# Patient Record
Sex: FEMALE | Race: BLACK OR AFRICAN AMERICAN | NOT HISPANIC OR LATINO | URBAN - METROPOLITAN AREA
[De-identification: names, ages, dates, MRNs, and addresses within clinical notes are randomized per-mention and may not be internally consistent; named-entity substitution may affect disease eponyms.]

---

## 2018-10-14 ENCOUNTER — EMERGENCY (EMERGENCY)
Facility: HOSPITAL | Age: 58
LOS: 0 days | Discharge: HOME | End: 2018-10-14
Attending: EMERGENCY MEDICINE
Payer: COMMERCIAL

## 2018-10-14 VITALS
DIASTOLIC BLOOD PRESSURE: 88 MMHG | OXYGEN SATURATION: 100 % | RESPIRATION RATE: 18 BRPM | TEMPERATURE: 99 F | HEART RATE: 114 BPM | WEIGHT: 139.99 LBS | SYSTOLIC BLOOD PRESSURE: 151 MMHG | HEIGHT: 61 IN

## 2018-10-14 VITALS
SYSTOLIC BLOOD PRESSURE: 131 MMHG | RESPIRATION RATE: 16 BRPM | DIASTOLIC BLOOD PRESSURE: 82 MMHG | OXYGEN SATURATION: 98 % | HEART RATE: 103 BPM | TEMPERATURE: 99 F

## 2018-10-14 DIAGNOSIS — Z88.0 ALLERGY STATUS TO PENICILLIN: ICD-10-CM

## 2018-10-14 DIAGNOSIS — R10.9 UNSPECIFIED ABDOMINAL PAIN: ICD-10-CM

## 2018-10-14 DIAGNOSIS — R91.1 SOLITARY PULMONARY NODULE: ICD-10-CM

## 2018-10-14 DIAGNOSIS — N93.9 ABNORMAL UTERINE AND VAGINAL BLEEDING, UNSPECIFIED: ICD-10-CM

## 2018-10-14 DIAGNOSIS — N30.90 CYSTITIS, UNSPECIFIED WITHOUT HEMATURIA: ICD-10-CM

## 2018-10-14 DIAGNOSIS — R31.9 HEMATURIA, UNSPECIFIED: ICD-10-CM

## 2018-10-14 LAB
ALBUMIN SERPL ELPH-MCNC: 3.6 G/DL — SIGNIFICANT CHANGE UP (ref 3.3–5)
ALP SERPL-CCNC: 72 U/L — SIGNIFICANT CHANGE UP (ref 40–120)
ALT FLD-CCNC: 15 U/L — SIGNIFICANT CHANGE UP (ref 12–78)
ANION GAP SERPL CALC-SCNC: 9 MMOL/L — SIGNIFICANT CHANGE UP (ref 5–17)
APPEARANCE UR: ABNORMAL
APTT BLD: 25.8 SEC — LOW (ref 27.5–37.4)
AST SERPL-CCNC: 4 U/L — LOW (ref 15–37)
BACTERIA # UR AUTO: ABNORMAL
BASOPHILS # BLD AUTO: 0.02 K/UL — SIGNIFICANT CHANGE UP (ref 0–0.2)
BASOPHILS NFR BLD AUTO: 0.2 % — SIGNIFICANT CHANGE UP (ref 0–2)
BILIRUB SERPL-MCNC: 0.5 MG/DL — SIGNIFICANT CHANGE UP (ref 0.2–1.2)
BILIRUB UR-MCNC: NEGATIVE — SIGNIFICANT CHANGE UP
BUN SERPL-MCNC: 9 MG/DL — SIGNIFICANT CHANGE UP (ref 7–23)
CALCIUM SERPL-MCNC: 9.1 MG/DL — SIGNIFICANT CHANGE UP (ref 8.5–10.1)
CHLORIDE SERPL-SCNC: 103 MMOL/L — SIGNIFICANT CHANGE UP (ref 96–108)
CO2 SERPL-SCNC: 28 MMOL/L — SIGNIFICANT CHANGE UP (ref 22–31)
COLOR SPEC: ABNORMAL
CREAT SERPL-MCNC: 0.72 MG/DL — SIGNIFICANT CHANGE UP (ref 0.5–1.3)
DIFF PNL FLD: ABNORMAL
EOSINOPHIL # BLD AUTO: 0.07 K/UL — SIGNIFICANT CHANGE UP (ref 0–0.5)
EOSINOPHIL NFR BLD AUTO: 0.7 % — SIGNIFICANT CHANGE UP (ref 0–6)
GLUCOSE SERPL-MCNC: 227 MG/DL — HIGH (ref 70–99)
GLUCOSE UR QL: 250 MG/DL
HCT VFR BLD CALC: 36.4 % — SIGNIFICANT CHANGE UP (ref 34.5–45)
HGB BLD-MCNC: 11.6 G/DL — SIGNIFICANT CHANGE UP (ref 11.5–15.5)
IMM GRANULOCYTES NFR BLD AUTO: 0.3 % — SIGNIFICANT CHANGE UP (ref 0–1.5)
INR BLD: 1.14 RATIO — SIGNIFICANT CHANGE UP (ref 0.88–1.16)
KETONES UR-MCNC: NEGATIVE — SIGNIFICANT CHANGE UP
LEUKOCYTE ESTERASE UR-ACNC: ABNORMAL
LYMPHOCYTES # BLD AUTO: 1.55 K/UL — SIGNIFICANT CHANGE UP (ref 1–3.3)
LYMPHOCYTES # BLD AUTO: 16.3 % — SIGNIFICANT CHANGE UP (ref 13–44)
MCHC RBC-ENTMCNC: 26.7 PG — LOW (ref 27–34)
MCHC RBC-ENTMCNC: 31.9 GM/DL — LOW (ref 32–36)
MCV RBC AUTO: 83.9 FL — SIGNIFICANT CHANGE UP (ref 80–100)
MONOCYTES # BLD AUTO: 0.46 K/UL — SIGNIFICANT CHANGE UP (ref 0–0.9)
MONOCYTES NFR BLD AUTO: 4.8 % — SIGNIFICANT CHANGE UP (ref 2–14)
NEUTROPHILS # BLD AUTO: 7.37 K/UL — SIGNIFICANT CHANGE UP (ref 1.8–7.4)
NEUTROPHILS NFR BLD AUTO: 77.7 % — HIGH (ref 43–77)
NITRITE UR-MCNC: NEGATIVE — SIGNIFICANT CHANGE UP
NRBC # BLD: 0 /100 WBCS — SIGNIFICANT CHANGE UP (ref 0–0)
PH UR: 5 — SIGNIFICANT CHANGE UP (ref 5–8)
PLATELET # BLD AUTO: 327 K/UL — SIGNIFICANT CHANGE UP (ref 150–400)
POTASSIUM SERPL-MCNC: 4 MMOL/L — SIGNIFICANT CHANGE UP (ref 3.5–5.3)
POTASSIUM SERPL-SCNC: 4 MMOL/L — SIGNIFICANT CHANGE UP (ref 3.5–5.3)
PROT SERPL-MCNC: 7.7 GM/DL — SIGNIFICANT CHANGE UP (ref 6–8.3)
PROT UR-MCNC: 100 MG/DL
PROTHROM AB SERPL-ACNC: 12.5 SEC — SIGNIFICANT CHANGE UP (ref 9.8–12.7)
RBC # BLD: 4.34 M/UL — SIGNIFICANT CHANGE UP (ref 3.8–5.2)
RBC # FLD: 13.3 % — SIGNIFICANT CHANGE UP (ref 10.3–14.5)
RBC CASTS # UR COMP ASSIST: >50 /HPF (ref 0–4)
SODIUM SERPL-SCNC: 140 MMOL/L — SIGNIFICANT CHANGE UP (ref 135–145)
SP GR SPEC: 1.02 — SIGNIFICANT CHANGE UP (ref 1.01–1.02)
UROBILINOGEN FLD QL: NEGATIVE MG/DL — SIGNIFICANT CHANGE UP
WBC # BLD: 9.5 K/UL — SIGNIFICANT CHANGE UP (ref 3.8–10.5)
WBC # FLD AUTO: 9.5 K/UL — SIGNIFICANT CHANGE UP (ref 3.8–10.5)
WBC UR QL: >50

## 2018-10-14 PROCEDURE — 74176 CT ABD & PELVIS W/O CONTRAST: CPT | Mod: 26

## 2018-10-14 PROCEDURE — 99284 EMERGENCY DEPT VISIT MOD MDM: CPT | Mod: 25

## 2018-10-14 RX ORDER — SODIUM CHLORIDE 9 MG/ML
1500 INJECTION INTRAMUSCULAR; INTRAVENOUS; SUBCUTANEOUS ONCE
Qty: 0 | Refills: 0 | Status: COMPLETED | OUTPATIENT
Start: 2018-10-14 | End: 2018-10-14

## 2018-10-14 RX ADMIN — SODIUM CHLORIDE 1500 MILLILITER(S): 9 INJECTION INTRAMUSCULAR; INTRAVENOUS; SUBCUTANEOUS at 11:11

## 2018-10-14 RX ADMIN — SODIUM CHLORIDE 750 MILLILITER(S): 9 INJECTION INTRAMUSCULAR; INTRAVENOUS; SUBCUTANEOUS at 09:24
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
## 2018-10-14 NOTE — ED ADULT NURSE NOTE - NSIMPLEMENTINTERV_GEN_ALL_ED
Implemented All Universal Safety Interventions:  [place] to call system. Call bell, personal items and telephone within reach. Instruct patient to call for assistance. Room bathroom lighting operational. Non-slip footwear when patient is off stretcher. Physically safe environment: no spills, clutter or unnecessary equipment. Stretcher in lowest position, wheels locked, appropriate side rails in place.

## 2018-10-14 NOTE — ED PROVIDER NOTE - PHYSICAL EXAMINATION
Gen: Alert, NAD Head: NC, AT, PERRL, EOMI, normal lids/conjunctiva ENT: normal hearing, patent oropharynx without erythema/exudate, uvula midline Neck: +supple, no tenderness/meningismus/JVD, +Trachea midline  Pulm: Bilateral BS, normal resp effort, no wheeze/stridor/retractions CV: RRR, no M/R/G, +dist pulses Abd: soft, NT/ND, +BS, no hepatosplenomegaly Mskel: no edema/erythema/cyanosis Skin: no rash Neuro: AAOx3, no sensory/motor deficits, CN 2-12 intact : mild Bilateral CVA TTP (R>>L)

## 2018-10-14 NOTE — ED PROVIDER NOTE - MEDICAL DECISION MAKING DETAILS
Patient with hematuria and falnk pain.  CT with nodule (discussed with good teachback) and cystitis.  pen allergic. w ill treat with FQ given blood.  feeling better.  wants to d/c.  will f/u.  Uneventful ED observation period. Non toxic.  Well appearing. Patient given prescription medications for their condition and advised to take them as prescribed and check with their Primary Care Provider if any questions arise. Discussed results and outcome of testing with the patient.  Patient advised to please follow up with their primary care doctor within the next 24 hours and return to the Emergency Department for worsening symptoms or any other concerns.  Patient advised that their doctor may call  to follow up on the specific results of the tests performed today in the emergency department.

## 2019-12-27 ENCOUNTER — EMERGENCY (EMERGENCY)
Facility: HOSPITAL | Age: 59
LOS: 0 days | Discharge: HOME | End: 2019-12-27
Payer: COMMERCIAL

## 2019-12-27 VITALS
DIASTOLIC BLOOD PRESSURE: 83 MMHG | OXYGEN SATURATION: 100 % | HEART RATE: 87 BPM | WEIGHT: 147.05 LBS | SYSTOLIC BLOOD PRESSURE: 155 MMHG | HEIGHT: 62 IN | TEMPERATURE: 98 F | RESPIRATION RATE: 16 BRPM

## 2019-12-27 DIAGNOSIS — Z88.0 ALLERGY STATUS TO PENICILLIN: ICD-10-CM

## 2019-12-27 DIAGNOSIS — Z79.84 LONG TERM (CURRENT) USE OF ORAL HYPOGLYCEMIC DRUGS: ICD-10-CM

## 2019-12-27 DIAGNOSIS — M79.605 PAIN IN LEFT LEG: ICD-10-CM

## 2019-12-27 DIAGNOSIS — I10 ESSENTIAL (PRIMARY) HYPERTENSION: ICD-10-CM

## 2019-12-27 PROCEDURE — 99284 EMERGENCY DEPT VISIT MOD MDM: CPT

## 2019-12-27 PROCEDURE — 93971 EXTREMITY STUDY: CPT | Mod: 26

## 2019-12-27 RX ORDER — GLIMEPIRIDE 1 MG
1 TABLET ORAL
Qty: 0 | Refills: 0 | DISCHARGE

## 2019-12-27 RX ORDER — IBUPROFEN 200 MG
600 TABLET ORAL ONCE
Refills: 0 | Status: COMPLETED | OUTPATIENT
Start: 2019-12-27 | End: 2019-12-27

## 2019-12-27 RX ORDER — METFORMIN HYDROCHLORIDE 850 MG/1
1 TABLET ORAL
Qty: 0 | Refills: 0 | DISCHARGE

## 2019-12-27 RX ADMIN — Medication 600 MILLIGRAM(S): at 16:54

## 2019-12-27 RX ADMIN — Medication 600 MILLIGRAM(S): at 16:24

## 2019-12-27 NOTE — ED ADULT TRIAGE NOTE - CHIEF COMPLAINT QUOTE
Pain to left calf extending to thigh and behind knee x 1 week, unrelieved with pain medication. States painful to climb stairs or to lift leg when trying to get in her tub.

## 2019-12-27 NOTE — ED PROVIDER NOTE - MUSCULOSKELETAL, MLM
left popliteal and calf tenderness, no edema, no palpable cord. Good ROM of knee and ankle with tenderness

## 2019-12-27 NOTE — ED PROVIDER NOTE - OBJECTIVE STATEMENT
59F here with left leg pain x 1 week. She reports pain behind the calf and knee. Denies swelling. No trauma or injury. No numbness or weakness. Pain is worse with ambulation. Denies fever, chills, nausea, vomiting. NO history of DVT/PE. Denies recent immobilization or travel. No exogenous estrogen. Tried Ibuprofen 2 days ago x 1 dose with some relief.

## 2019-12-27 NOTE — ED PROVIDER NOTE - CLINICAL SUMMARY MEDICAL DECISION MAKING FREE TEXT BOX
atraumatic leg left pain localized behind knee and calf, no bony tenderness, good ROM of joints, consider msk strain vs DVT vs bakers cyst. Recommend duplex, ibuprofen and will observe

## 2019-12-27 NOTE — ED PROVIDER NOTE - PATIENT PORTAL LINK FT
You can access the FollowMyHealth Patient Portal offered by [facility] by registering at the following website: [email or web address]. By joining [facility] [facility]’s FollowMyHealth portal, you will also be able to view your health information using other applications (apps) compatible with our system.

## 2019-12-27 NOTE — ED ADULT NURSE NOTE - OBJECTIVE STATEMENT
pt A&Ox4 with c/o Pain to left calf extending to thigh and behind knee x 1 week, unrelieved with pain medication. States painful to climb stairs or to lift leg when trying to get in her tub. PMH DM, HTN. Denies Chest pain, SOB

## 2024-03-05 NOTE — ED ADULT NURSE NOTE - CADM POA URETHRAL CATHETER
This patient contacted the office for the following prescriptions to be refilled:    Medication requested :   Requested Prescriptions     Pending Prescriptions Disp Refills    enalapril (VASOTEC) 20 MG tablet 90 tablet 1     Sig: TAKE 1 TABLET DAILY      LAST REFILLED: 10/24/2023 90 tabs, 1 RF  PCP: Nancy Babcock MD  LOV: 10/24/2023  NOV: 4/23/2024  FUTURE APPT:   Future Appointments   Date Time Provider Department Center   4/23/2024  9:30 AM Nancy Babcock MD [facility] BS AMB     LABS: No results found for this or any previous visit (from the past 2160 hour(s)).      Thank you.   No

## 2024-10-28 ENCOUNTER — EMERGENCY (EMERGENCY)
Facility: HOSPITAL | Age: 64
LOS: 0 days | Discharge: HOME | End: 2024-10-28
Attending: STUDENT IN AN ORGANIZED HEALTH CARE EDUCATION/TRAINING PROGRAM
Payer: COMMERCIAL

## 2024-10-28 VITALS
WEIGHT: 143.96 LBS | OXYGEN SATURATION: 98 % | TEMPERATURE: 98 F | DIASTOLIC BLOOD PRESSURE: 90 MMHG | HEART RATE: 98 BPM | SYSTOLIC BLOOD PRESSURE: 153 MMHG | HEIGHT: 61.5 IN | RESPIRATION RATE: 15 BRPM

## 2024-10-28 DIAGNOSIS — I10 ESSENTIAL (PRIMARY) HYPERTENSION: ICD-10-CM

## 2024-10-28 DIAGNOSIS — Z88.0 ALLERGY STATUS TO PENICILLIN: ICD-10-CM

## 2024-10-28 DIAGNOSIS — E11.9 TYPE 2 DIABETES MELLITUS WITHOUT COMPLICATIONS: ICD-10-CM
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
## 2024-10-28 PROCEDURE — 99283 EMERGENCY DEPT VISIT LOW MDM: CPT

## 2024-10-28 PROCEDURE — 99053 MED SERV 10PM-8AM 24 HR FAC: CPT

## 2024-10-28 NOTE — ED PROVIDER NOTE - NSFOLLOWUPCLINICS_GEN_ALL_ED_FT
[facility]  Internal Medicine  [address] [place] [address]  Phone: [phone]  Fax:     [facility] Internal Medicine  General Internal Medicine  [address] [place] [id number]  Phone: [phone]  Fax:     [facility]  Internal Medicine  [address] [place] [address]  Phone: [phone]  Fax: [phone]    [facility]  Internal Medicine  [address] [place] [address]  Phone: [phone]  Fax: [phone]    [facility]  Internal Medicine  [address] [place] [address]  Phone: [phone]  Fax:
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
## 2024-10-28 NOTE — ED ADULT NURSE NOTE - NSICDXPASTMEDICALHX_GEN_ALL_CORE_FT
PAST MEDICAL HISTORY:  HTN (hypertension)      Star Wedge Flap Text: The defect edges were debeveled with a #15 scalpel blade.  Given the location of the defect, shape of the defect and the proximity to free margins a star wedge flap was deemed most appropriate.  Using a sterile surgical marker, an appropriate rotation flap was drawn incorporating the defect and placing the expected incisions within the relaxed skin tension lines where possible. The area thus outlined was incised deep to adipose tissue with a #15 scalpel blade.  The skin margins were undermined to an appropriate distance in all directions utilizing iris scissors.

## 2024-10-28 NOTE — ED PROVIDER NOTE - CARE PROVIDER_API CALL
[facility]  Internal Medicine  [address] [place] [address]  Phone: [phone]  Fax: [phone]  Follow Up Time: 1-3 Days

## 2024-10-28 NOTE — ED PROVIDER NOTE - PATIENT PORTAL LINK FT
You can access the FollowMyHealth Patient Portal offered by [facility] by registering at the following website: [email or web address]. By joining [employer]’s FollowMyHealth portal, you will also be able to view your health information using other applications (apps) compatible with our system.

## 2024-10-28 NOTE — ED PROVIDER NOTE - NSFOLLOWUPCLINICSTOKEN_GEN_ALL_ED_FT
[id number]: || ||00\01||False;[id number]: || ||00\01||False;[id number]: || ||00\01||False;[id number]: || ||00\01||False;[id number]: || ||00\01||False;

## 2024-10-28 NOTE — ED PROVIDER NOTE - NSFOLLOWUPINSTRUCTIONS_ED_ALL_ED_FT
Chief Complaint   Patient presents with   • Back Pain       CC:  Your for evaluation of abdominal pain and back pain.    HPI: patient is here for evaluation of abdominal pain.  There is pain in the abdomen in the lower area in the right low back.  It almost feels like a kidney stone.  She has had some chills.  She has had no change in urinary urgency or frequency and she has had no dysuria.  The pain comes in waves.  She can be very comfortable and there all of a sudden it is quite severe.  She is nauseated.  Everything started suddenly this morning I woke her up at 4 o'clock.  She has not had much of an appetite.  She denies fevers or chills.  She denies diarrhea or constipation.    Past Medical History   Diagnosis Date   • Depressive disorder, not elsewhere classified    • Dyslipidemia with high LDL and low HDL 12/8/2013   • Hirsutism    • History of abnormal Pap smear 2003     GISELL III, LEEP performed, negative PAP since   • OVARIAN CYST 12/9/2004   • Polycystic ovaries 12/13/1996   • PONV (postoperative nausea and vomiting)    • Right lateral epicondylitis      work related       Problem list reviewed and updated  Outpatient Prescriptions Marked as Taking for the 1/12/17 encounter (Office Visit) with Jose Alan, DO   Medication Sig Dispense Refill   • LORazepam (ATIVAN) 0.5 MG tablet TAKE 1 TABLET BY MOUTH WHEN LEAVING HOME AND 1 TABLET BY MOUTH 30 MINUTES BEFORE BOARDING PLANE AS NEEDED 12 tablet 3   • meclizine HCl (ANTIVERT) 25 MG tablet Take 1 tablet by mouth every 6 hours as needed for Dizziness. 30 tablet 1   • ibuprofen (MOTRIN) 200 MG tablet Take 2 tablets by mouth every 6 hours as needed for Pain. 30 tablet 0     Medications reviewed and updated.  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[employer]      [employer]    •  [employer]     Social History Main Topics   • Smoking status: Former Smoker     Packs/day: 0.25     Years: 15.00     Types: Cigarettes   • Smokeless tobacco: Never Used      Comment: quit x 1 year    • Alcohol use No   • Drug use: No   • Sexual activity: Yes     Partners: Male      Comment: ablation     Other Topics Concern   • Occupational Exposure Yes   • Stress Concern Yes   • Back Care Yes   • Exercise No   • Seat Belt Yes     Social History Narrative    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 Dylan Ospina   • D and c  3/11/03     Dr. Dylan Ospina   • Hysteroscopy endometrial ablation  03     Dr. Dylan Ospina   • Bladder repair  ~  age 5    • Tenotomy elbow debrde tisue bone tndn rpair  2013     Right elbow       Family History   Problem Relation Age of Onset   • NEGATIVE FAMILY HX OF Mother    • NEGATIVE FAMILY HX OF Father    • Cancer Maternal Grandmother       from  colon Ca   • Diabetes Maternal Grandmother    • NEGATIVE FAMILY HX OF Sister    • * Brother      [facility] Brother - [place]      Family Status   Relation Status   • Mother Alive   • Father Alive   • Maternal Grandmother    • Sister Alive   • Brother Other       Pertinent family history reviewed    Review of Systems:    Patient reports :  No exertional chest pain or shortness of breath.  No orthopnea nor paroxysmal nocturnal dyspnea.  No nausea, vomiting,  gerd,  dyspepsia dysphagia.  Appetite is fine.  No unexplained weight loss, weight gain, hot or cold intolerance.  No melena hematochezia hematuria nor dysuria.  No change in urinary frequency or urgency.  denies any vision  changes neurological changes, such as blurred vision, double vision or significant headache.  has had no numbness or tingling in the feet.   denies any other rashes or skin lesion no fevers or chills.    PE: Blood pressure 134/76, pulse 64, weight 87.5 kg.    GENERAL: the patient appears to be their stated age. The patient is in no apparent distress and is well developed and well nourished and exhibits overweight body habitus.   NOSE: patent with normal mucosa. No significant turbinate hypertrophy noted.  OROPHARYNX : moist and without lesions . No oral pallor . Uvula midline.  No obstruction.  No evidence of tonsil hypertrophy.  LUNG:breath sounds are symmetrical and the patient has normal inspiratory/expiratory sounds without rales,  rhonchi and without wheezes.   HEART: regular rhythm with normal rate, S1 and S2 normal and no murmur noted . No S3 or S4. No rub.  There are palpable carotid up-berrios bilaterally.    ABDOMEN: not distended.  The abdomen is soft, nontender, and is without masses. There is no voluntary or involuntary guarding. Bowel sounds are active in all quadrants. No hepatosplenomegaly is palpable and there is no flank tenderness on percussion. There is some mild right flank tenderness on percussion.  : Deferred   RECTAL: deferred   EXTREMITIES: moves upper and lower extremities well.  The patient does not have any upper extremity edema, clubbing or cyanosis. Lower extremities show no edema.    Abdominal x-ray suggests right-sided ureteral stone.  Personally reviewed with the patient.    IMPRESSION:  N23 Renal colic on right side  (primary encounter diagnosis)  PLAN:    (N23) Renal colic on right side  (primary encounter diagnosis)  Comment: most likely right-sided ureteral stone  Plan: ketorolac (TORADOL) injection 60 mg, XR ABDOMEN        1 VW KUB SUPINE, URINALYSIS WITH MICRO EXAM W/O        C/S          The patient was provided with and injection of toradol for pain.  Sin definitely improved  her pain.  I reviewed renal colic.  I reviewed the need to manage this aggressively with hydration and pain medication was discussed.  See orders.  Urine analysis does not suggest infection.    If things get worse she will present to the emergency room.    URINALYSIS WITH MICRO EXAM W/O C/S  Dx: N23 Renal colic on right side      Return in about 3 days (around 1/15/2017), or if symptoms worsen or fail to improve.      See orders as entered this encounter. See patient instructions as documented within this encounter.    For new acute problems the  patient understands that this is a provisional diagnosis. Provisional diagnosis can and do change. The diagnosis provided  is based on the history and symptoms with which the patient presented today.                    followup with primary care in next 1-3 days for blood pressure and glucose evaluation    Hypertension    Hypertension, commonly called high blood pressure, is when the force of blood pumping through your arteries is too strong. Hypertension forces your heart to work harder to pump blood. Your arteries may become narrow or stiff. Having untreated or uncontrolled hypertension for a long period of time can cause heart attack, stroke, kidney disease, and other problems. If started on a medication, take exactly as prescribed by your health care professional. Maintain a healthy lifestyle and follow up with your primary care physician.    SEEK IMMEDIATE MEDICAL CARE IF YOU HAVE ANY OF THE FOLLOWING SYMPTOMS: severe headache, confusion, chest pain, abdominal pain, vomiting, or shortness of breath.

## 2024-10-28 NOTE — ED PROVIDER NOTE - PHYSICAL EXAMINATION
General: Well appearing female in no acute distress  HEENT: Normocephalic, atraumatic. Moist mucous membranes. Oropharynx clear. No lymphadenopathy.  Eyes: No scleral icterus. EOMI. GIANNI.  Neck:. Soft and supple. Full ROM without pain. No midline tenderness  Cardiac: Regular rate and regular rhythm. No murmurs, rubs, gallops. Peripheral pulses 2+ and symmetric. No LE edema.  Resp: Lungs CTAB. Speaking in full sentences. No wheezes, rales or rhonchi.  Abd: Soft, non-tender, non-distended. No guarding or rebound. No scars, masses, or lesions.  Back: Spine midline and non-tender. No CVA tenderness.    Skin: No rashes, abrasions, or lacerations.  Neuro: AO x 3. Moves all extremities symmetrically. Motor strength and sensation grossly intact. steady gait.

## 2024-10-28 NOTE — ED ADULT TRIAGE NOTE - PATIENT ON (OXYGEN DELIVERY METHOD)
room air
Pt was admitted to OR for elective surgery for left reverse total shoulder due to pain, OA and DJD.

## 2024-10-28 NOTE — ED PROVIDER NOTE - NS ED MD DISPO DISCHARGE CCDA
Latest platelet av=518, change in value from 10/18 which was 213. Valine Ny FRIEDMAN informed.   Patient/Caregiver provided printed discharge information.
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
## 2024-12-12 NOTE — ED ADULT NURSE NOTE - NSIMPLEMENTINTERV_GEN_ALL_ED
none Implemented All Universal Safety Interventions:  [place] to call system. Call bell, personal items and telephone within reach. Instruct patient to call for assistance. Room bathroom lighting operational. Non-slip footwear when patient is off stretcher. Physically safe environment: no spills, clutter or unnecessary equipment. Stretcher in lowest position, wheels locked, appropriate side rails in place.

## 2025-03-26 NOTE — ED ADULT TRIAGE NOTE - CCCP TRG CHIEF CMPLNT
Call placed to Dr. Bry Vogel regarding HR control. Pt to received 1 mg IV Ativan prior to leaving the unit for anxiety. TO/RB to proceed directly with Diltiazem protocol and if HR still not below 65 ok to proceed with scan.   hypertension